# Patient Record
(demographics unavailable — no encounter records)

---

## 2025-01-23 NOTE — PHYSICAL EXAM
[General Appearance - Alert] : alert [General Appearance - Well Nourished] : well nourished [General Appearance - In No Acute Distress] : in no acute distress [General Appearance - Well Developed] : well developed [Oriented To Time, Place, And Person] : oriented to person, place, and time [Impaired Insight] : insight and judgment were intact [Affect] : the affect was normal [Mood] : the mood was normal [Motor Tone] : muscle tone was normal in all four extremities [Motor Strength] : muscle strength was normal in all four extremities [Balance] : balance was intact [Sclera] : the sclera and conjunctiva were normal [PERRL With Normal Accommodation] : pupils were equal in size, round, reactive to light, with normal accommodation [Extraocular Movements] : extraocular movements were intact [Full Visual Field] : full visual field [Outer Ear] : the ears and nose were normal in appearance [Neck Appearance] : the appearance of the neck was normal [Both Tympanic Membranes Were Examined] : both tympanic membranes were normal [] : no respiratory distress [Heart Rate And Rhythm] : heart rate was normal and rhythm regular [Bowel Sounds] : normal bowel sounds [Abnormal Walk] : normal gait [Skin Color & Pigmentation] : normal skin color and pigmentation

## 2025-01-23 NOTE — REVIEW OF SYSTEMS
[As Noted in HPI] : as noted in HPI [Numbness] : numbness [Lightheadedness] : lightheadedness [Tingling] : tingling [Negative] : Endocrine

## 2025-01-23 NOTE — PHYSICAL EXAM
[General Appearance - Alert] : alert [General Appearance - In No Acute Distress] : in no acute distress [General Appearance - Well Nourished] : well nourished [General Appearance - Well Developed] : well developed [Oriented To Time, Place, And Person] : oriented to person, place, and time [Impaired Insight] : insight and judgment were intact [Affect] : the affect was normal [Mood] : the mood was normal [Motor Strength] : muscle strength was normal in all four extremities [Motor Tone] : muscle tone was normal in all four extremities [Balance] : balance was intact [Sclera] : the sclera and conjunctiva were normal [PERRL With Normal Accommodation] : pupils were equal in size, round, reactive to light, with normal accommodation [Extraocular Movements] : extraocular movements were intact [Full Visual Field] : full visual field [Outer Ear] : the ears and nose were normal in appearance [Both Tympanic Membranes Were Examined] : both tympanic membranes were normal [Neck Appearance] : the appearance of the neck was normal [] : no respiratory distress [Heart Rate And Rhythm] : heart rate was normal and rhythm regular [Bowel Sounds] : normal bowel sounds [Abnormal Walk] : normal gait [Skin Color & Pigmentation] : normal skin color and pigmentation

## 2025-01-23 NOTE — REVIEW OF SYSTEMS
[As Noted in HPI] : as noted in HPI [Numbness] : numbness [Tingling] : tingling [Lightheadedness] : lightheadedness [Negative] : Endocrine

## 2025-01-24 NOTE — DATA REVIEWED
[de-identified] : MR ANGIO BRAIN ORDERED BY: EMANUEL JAAM 1/09/2025  INTERPRETATION: CLINICAL INDICATION: Fibromuscular dysplasia.  TECHNIQUE: MRA of the head was performed with noncontrast time-of-flight technique. Multiplanar reformations and maximum intensity projections were reviewed. Additionally, axial T2/FLAIR and diffusion-weighted sequences were obtained. Phase contrast quantitative MR angiography was also performed.  COMPARISON: None  FINDINGS: Flow related signal is not observed in the cervical left internal carotid artery. Left external carotid artery branches in the neck appear unusually prominent, including asymmetric prominence of the left internal maxillary and middle meningeal arteries as well as the artery of foramen ovale. Flow related signal within the left internal carotid artery becomes apparent in the distal cavernous segment of the artery, continues with prominent signal from the inferolateral trunk. Within the distal cavernous and proximal supraclinoid left internal carotid artery, flow related signal is thin. Distally, the left ICA is continuous with a large posterior communicating artery and the left middle cerebral artery. These vessels appear relatively normal without stenosis. Left posterior communicating artery is large. Left anterior cerebral artery is not well seen in its A1 segment, with reconstitution of the left anterior cerebral artery A2 segment through the anterior communicating artery. 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 Right cervical internal carotid artery is patent with mild irregularity near the skull base and appearance characteristic of fibrous dysplasia. Right internal carotid artery cavernous segment is tortuous with a large aneurysm arising from the dorsal aspect of the proximal cavernous artery, pointing anteriorly and superiorly, measuring about 5 x 5 x 5 mm on image 1:30 of series 201. This aneurysm appears to be a small neck about 2 mm. Distally, the tortuous right internal carotid artery supplies a relatively normal appearing middle cerebral artery and a tortuous but patent right anterior cerebral artery. A large posterior communicating artery is noted. Both vertebral arteries are patent in the neck. Left vertebral artery is smaller than the right.. Both vessels can be followed to their junction into the basilar artery. Basilar artery is patent. Both posterior cerebral arteries appear patent and relatively normal. At the level of the roof of the lateral ventricles, arborization in the bilateral MCA territories appears relatively similar, questionably slightly greater on the right. Background brain parenchyma is relatively unremarkable except for questionable minimal narrowing greater than expected global cerebral atrophy. No acute infarct is appreciated.  LEFT: ICA Not measured. MCA M1 188 ESTEBAN A1 Not measured. ESTEBAN A2 92 PCA P2 106 P-comm 183 (from the posterior to the anterior circulation) Vertebral 81  RIGHT:  MCA M1 149 ESTEBAN A1 184 ESTEBAN A2 80 PCA P2 69 P-comm 59 (from the anterior to the posterior circulation) Vertebral 269  BASILAR: 239  IMPRESSION:  1. No flow in the left internal carotid artery from the inferior margin of the field-of-view in the upper neck until a the point in the distal cavernous segment where there is reconstitution of flow through extensive collateralization.  2. Secondary to this extensive collateralization through the external carotid artery and the posterior circulation (with posterior to anterior flow in the large left posterior communicating artery), there is is relatively equal flow in the left and right middle cerebral arteries.  3. Saccular aneurysm measuring about 5 mm arising from the dorsal aspect of the proximal cavernous right internal carotid artery

## 2025-01-24 NOTE — DATA REVIEWED
[de-identified] : MR ANGIO BRAIN ORDERED BY: EMANUEL JAAM 1/09/2025  INTERPRETATION: CLINICAL INDICATION: Fibromuscular dysplasia.  TECHNIQUE: MRA of the head was performed with noncontrast time-of-flight technique. Multiplanar reformations and maximum intensity projections were reviewed. Additionally, axial T2/FLAIR and diffusion-weighted sequences were obtained. Phase contrast quantitative MR angiography was also performed.  COMPARISON: None  FINDINGS: Flow related signal is not observed in the cervical left internal carotid artery. Left external carotid artery branches in the neck appear unusually prominent, including asymmetric prominence of the left internal maxillary and middle meningeal arteries as well as the artery of foramen ovale. Flow related signal within the left internal carotid artery becomes apparent in the distal cavernous segment of the artery, continues with prominent signal from the inferolateral trunk. Within the distal cavernous and proximal supraclinoid left internal carotid artery, flow related signal is thin. Distally, the left ICA is continuous with a large posterior communicating artery and the left middle cerebral artery. These vessels appear relatively normal without stenosis. Left posterior communicating artery is large. Left anterior cerebral artery is not well seen in its A1 segment, with reconstitution of the left anterior cerebral artery A2 segment through the anterior communicating artery. 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 Right cervical internal carotid artery is patent with mild irregularity near the skull base and appearance characteristic of fibrous dysplasia. Right internal carotid artery cavernous segment is tortuous with a large aneurysm arising from the dorsal aspect of the proximal cavernous artery, pointing anteriorly and superiorly, measuring about 5 x 5 x 5 mm on image 1:30 of series 201. This aneurysm appears to be a small neck about 2 mm. Distally, the tortuous right internal carotid artery supplies a relatively normal appearing middle cerebral artery and a tortuous but patent right anterior cerebral artery. A large posterior communicating artery is noted. Both vertebral arteries are patent in the neck. Left vertebral artery is smaller than the right.. Both vessels can be followed to their junction into the basilar artery. Basilar artery is patent. Both posterior cerebral arteries appear patent and relatively normal. At the level of the roof of the lateral ventricles, arborization in the bilateral MCA territories appears relatively similar, questionably slightly greater on the right. Background brain parenchyma is relatively unremarkable except for questionable minimal narrowing greater than expected global cerebral atrophy. No acute infarct is appreciated.  LEFT: ICA Not measured. MCA M1 188 ESTEBAN A1 Not measured. ESTEBAN A2 92 PCA P2 106 P-comm 183 (from the posterior to the anterior circulation) Vertebral 81  RIGHT:  MCA M1 149 ESTEBAN A1 184 ESTEBAN A2 80 PCA P2 69 P-comm 59 (from the anterior to the posterior circulation) Vertebral 269  BASILAR: 239  IMPRESSION:  1. No flow in the left internal carotid artery from the inferior margin of the field-of-view in the upper neck until a the point in the distal cavernous segment where there is reconstitution of flow through extensive collateralization.  2. Secondary to this extensive collateralization through the external carotid artery and the posterior circulation (with posterior to anterior flow in the large left posterior communicating artery), there is is relatively equal flow in the left and right middle cerebral arteries.  3. Saccular aneurysm measuring about 5 mm arising from the dorsal aspect of the proximal cavernous right internal carotid artery

## 2025-01-24 NOTE — HISTORY OF PRESENT ILLNESS
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   Patient suffered stroke July 2024. She was at work when suddenly she noticed a glare in her Left eye, blurriness, severe headache, tingling in Left hand, and unable to complete simple tasks. She presented to [facility] ED where full stroke work up completed, found with Left posterior stroke 2/2 Left ICA dissection in setting of FMD. She was discharged with referral to Stroke Neurology and Neurosurgery. 2 weeks later, patient had another episode of Left eye blurry vision, lasting 5 mins. Since the stroke, she has had significant headaches, especially with activity. Stroke Neuro prescribed Gabapentin with good effect as well as Eliquis 5mg. Plan per Stroke Neuro was to start ASA 81mg in Jan 2025, and Neurosurgery recommended surgical intervention for the Left ICA occlusion.  12/05/24 - first appt with Dr. Gentile. Patient states she is doing well, Brain fog from stroke has noticeably improved. Following imaging reviewed:  - MRI Brain 11/08/24 - CTA Head/Neck 9/24 - diagnostic cerebral angiogram 7/05 - MRI Brain wawo 7/03 - MRV Head 7/03 - CTA Head/Neck 7/03 Plan to obtain new MRA NOVA and NM Spect, f/u with Stroke Neurology and Rheumatology.   TODAY patient endorses feeling fine other than an episode of feeling faint 2 weeks ago, lasting 5 mins. Endorses getting light-headed after working out or lowering head below heart. Right hand tingling since mid Jan. Saw Stroke Neurology last week, started patient on Aspirin 81mg daily, d/c Eliquis.    Soc Hx: Non-smoker, occasional EtOH, No known family history of strokes  PCP: none  Stroke Neuro: Dr. Antonia Pruitt [facility]  Neurosurgery: Dr. Mays

## 2025-01-24 NOTE — HISTORY OF PRESENT ILLNESS
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   Patient suffered stroke July 2024. She was at work when suddenly she noticed a glare in her Left eye, blurriness, severe headache, tingling in Left hand, and unable to complete simple tasks. She presented to [facility] ED where full stroke work up completed, found with Left posterior stroke 2/2 Left ICA dissection in setting of FMD. She was discharged with referral to Stroke Neurology and Neurosurgery. 2 weeks later, patient had another episode of Left eye blurry vision, lasting 5 mins. Since the stroke, she has had significant headaches, especially with activity. Stroke Neuro prescribed Gabapentin with good effect as well as Eliquis 5mg. Plan per Stroke Neuro was to start ASA 81mg in Jan 2025, and Neurosurgery recommended surgical intervention for the Left ICA occlusion.  12/05/24 - first appt with Dr. Gentile. Patient states she is doing well, Brain fog from stroke has noticeably improved. Following imaging reviewed:  - MRI Brain 11/08/24 - CTA Head/Neck 9/24 - diagnostic cerebral angiogram 7/05 - MRI Brain wawo 7/03 - MRV Head 7/03 - CTA Head/Neck 7/03 Plan to obtain new MRA NOVA and NM Spect, f/u with Stroke Neurology and Rheumatology.   TODAY patient endorses feeling fine other than an episode of feeling faint 2 weeks ago, lasting 5 mins. Endorses getting light-headed after working out or lowering head below heart. Right hand tingling since mid Jan. Saw Stroke Neurology last week, started patient on Aspirin 81mg daily, d/c Eliquis.    Soc Hx: Non-smoker, occasional EtOH, No known family history of strokes  PCP: none  Stroke Neuro: Dr. Antonia Pruitt [facility]  Neurosurgery: Dr. Mays

## 2025-01-24 NOTE — DATA REVIEWED
[de-identified] : MR ANGIO BRAIN ORDERED BY: EMANUEL JAMA 1/09/2025  INTERPRETATION: CLINICAL INDICATION: Fibromuscular dysplasia.  TECHNIQUE: MRA of the head was performed with noncontrast time-of-flight technique. Multiplanar reformations and maximum intensity projections were reviewed. Additionally, axial T2/FLAIR and diffusion-weighted sequences were obtained. Phase contrast quantitative MR angiography was also performed.  COMPARISON: None  FINDINGS: Flow related signal is not observed in the cervical left internal carotid artery. Left external carotid artery branches in the neck appear unusually prominent, including asymmetric prominence of the left internal maxillary and middle meningeal arteries as well as the artery of foramen ovale. Flow related signal within the left internal carotid artery becomes apparent in the distal cavernous segment of the artery, continues with prominent signal from the inferolateral trunk. Within the distal cavernous and proximal supraclinoid left internal carotid artery, flow related signal is thin. Distally, the left ICA is continuous with a large posterior communicating artery and the left middle cerebral artery. These vessels appear relatively normal without stenosis. Left posterior communicating artery is large. Left anterior cerebral artery is not well seen in its A1 segment, with reconstitution of the left anterior cerebral artery A2 segment through the anterior communicating artery. 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 Right cervical internal carotid artery is patent with mild irregularity near the skull base and appearance characteristic of fibrous dysplasia. Right internal carotid artery cavernous segment is tortuous with a large aneurysm arising from the dorsal aspect of the proximal cavernous artery, pointing anteriorly and superiorly, measuring about 5 x 5 x 5 mm on image 1:30 of series 201. This aneurysm appears to be a small neck about 2 mm. Distally, the tortuous right internal carotid artery supplies a relatively normal appearing middle cerebral artery and a tortuous but patent right anterior cerebral artery. A large posterior communicating artery is noted. Both vertebral arteries are patent in the neck. Left vertebral artery is smaller than the right.. Both vessels can be followed to their junction into the basilar artery. Basilar artery is patent. Both posterior cerebral arteries appear patent and relatively normal. At the level of the roof of the lateral ventricles, arborization in the bilateral MCA territories appears relatively similar, questionably slightly greater on the right. Background brain parenchyma is relatively unremarkable except for questionable minimal narrowing greater than expected global cerebral atrophy. No acute infarct is appreciated.  LEFT: ICA Not measured. MCA M1 188 ESTEBAN A1 Not measured. ESTEBAN A2 92 PCA P2 106 P-comm 183 (from the posterior to the anterior circulation) Vertebral 81  RIGHT:  MCA M1 149 ESTEBAN A1 184 ESTEBAN A2 80 PCA P2 69 P-comm 59 (from the anterior to the posterior circulation) Vertebral 269  BASILAR: 239  IMPRESSION:  1. No flow in the left internal carotid artery from the inferior margin of the field-of-view in the upper neck until a the point in the distal cavernous segment where there is reconstitution of flow through extensive collateralization.  2. Secondary to this extensive collateralization through the external carotid artery and the posterior circulation (with posterior to anterior flow in the large left posterior communicating artery), there is is relatively equal flow in the left and right middle cerebral arteries.  3. Saccular aneurysm measuring about 5 mm arising from the dorsal aspect of the proximal cavernous right internal carotid artery

## 2025-01-24 NOTE — ASSESSMENT
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 NOVA shows good flow in left MCA SPECT shows no evidence of decreased perfusion.   I have discussed the natural history and treatment options for Fibromuscular Dysplasia with the patient. I explained the indications for observation with imaging surveillance, medical management, as well as endovascular treatments. We discussed the risks, benefits, possible complications, and expected outcomes related to each treatment option. In the end I recommend repeat MRA of the head and neck in 1 yr to f/u her cerebral vasculature.    PLAN: - Repeat MRA Head/Neck in 1 yr (Jan 2026) - referral to Rheumatology re-given to patient - return to Dr. Gentile clinic to review   The patient was instructed that if they should immediately call 911 or go to the Emergency Department if they experience symptoms of severe thunderclap headache, syncope, unexplained nausea/vomiting, visual changes, seizure-like activity, new weakness or numbness of extremities.   I reviewed and answered all patient questions. Patient verbalizes agreement and understanding with plan of care. Patient verbalizes agreement and understanding with plan of care.  I, Dr. Gentile, personally performed the evaluation and management (E/M) services for this established patient who presents today with (a) new problem(s)/exacerbation of (an) existing condition(s). That E/M includes conducting the examination, assessing all new/exacerbated conditions, and establishing a new plan of care. Today, CHEO Delgado, was here to observe my evaluation and management services for this new problem/exacerbated condition to be followed going forward.

## 2025-01-24 NOTE — HISTORY OF PRESENT ILLNESS
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   Patient suffered stroke July 2024. She was at work when suddenly she noticed a glare in her Left eye, blurriness, severe headache, tingling in Left hand, and unable to complete simple tasks. She presented to [facility] ED where full stroke work up completed, found with Left posterior stroke 2/2 Left ICA dissection in setting of FMD. She was discharged with referral to Stroke Neurology and Neurosurgery. 2 weeks later, patient had another episode of Left eye blurry vision, lasting 5 mins. Since the stroke, she has had significant headaches, especially with activity. Stroke Neuro prescribed Gabapentin with good effect as well as Eliquis 5mg. Plan per Stroke Neuro was to start ASA 81mg in Jan 2025, and Neurosurgery recommended surgical intervention for the Left ICA occlusion.  12/05/24 - first appt with Dr. Gentile. Patient states she is doing well, Brain fog from stroke has noticeably improved. Following imaging reviewed:  - MRI Brain 11/08/24 - CTA Head/Neck 9/24 - diagnostic cerebral angiogram 7/05 - MRI Brain wawo 7/03 - MRV Head 7/03 - CTA Head/Neck 7/03 Plan to obtain new MRA NOVA and NM Spect, f/u with Stroke Neurology and Rheumatology.   TODAY patient endorses feeling fine other than an episode of feeling faint 2 weeks ago, lasting 5 mins. Endorses getting light-headed after working out or lowering head below heart. Right hand tingling since mid Jan. Saw Stroke Neurology last week, started patient on Aspirin 81mg daily, d/c Eliquis.    Soc Hx: Non-smoker, occasional EtOH, No known family history of strokes  PCP: none  Stroke Neuro: Dr. Antonia Pruitt [facility]  Neurosurgery: Dr. Mays

## 2025-01-24 NOTE — REASON FOR VISIT
[Follow-Up: _____] : a [unfilled] follow-up visit [Spouse] : spouse [Family Member] : family member [FreeTextEntry1] : Left ICA dissection and occlusion

## 2025-03-26 NOTE — HISTORY OF PRESENT ILLNESS
[FreeTextEntry1] :  - patient loss feeling over the right side with blurry vision and decreased ability to understand - diagnosed with fibromuscular d/c on eliquis - now os ASA 81 mg daily has recovered fully told as a child to have some abnormalities with her heart  feels well with no complains today

## 2025-04-28 NOTE — REASON FOR VISIT
[Consultation] : a consultation regarding [FreeTextEntry1] : 28 year old F with prior history of CVA 7/2024 (left posterior stroke 2/2 LICA dissection), proximal cavernous FLORENCE aneurysm, referred for evaluation of FMD.   Stroke 7/2024. She was at work when suddenly she noticed a glare in her Left eye, blurriness, severe headache, tingling in Left hand, and unable to complete simple tasks. She presented to [facility] ED where full stroke work up completed, found with Left posterior stroke 2/2 Left ICA dissection; she was told this was all related to FMD. 2 weeks post had recurrence of left blurry vision. She also had significant migraines. This has been improved with Gabapentin and Elliguis 5mg bid.  Now transitioned to ASA 81mg.   Still with headaches with exercise. Happened when she was wearing weights while working out.   She has only MRA of the head in the chart; no prior CTA neck, chest, abd/pelvis. She thinks maybe they did a CTA somewhere. She would like to look before repeating scans.   Migraines since the stroke. No history of pulsatile tinnitus. No issues with hypertension.   Only thing she can attribute to that day of the stroke is being really stressed. No history of prior neck massage or neck manipulation.   One child, no issue with pregnancy. No family history of stroke, intracranial aneurysm, FMD, dissection. No SCD.

## 2025-04-28 NOTE — ASSESSMENT
[FreeTextEntry1] : #history of posterior stroke, LICA dissection No dx of FMD prior to this; will need dedicated CTA neck to look at FLORENCE (MRA can have artifact, not definitive FMD on that side). Also need CTA abd/pelvis to look for FMD and visceral aneurysms.   -imaging as above -she is going to get her imaging from OSH where she thinks she had a CTA to minimize amount of scans -ASA 81mg -discussed monitoring BP; avoidance of  rapid head and neck movements (rollercoasters and chiropractic neck manipulation), avoidance of heavy lifting (requiring straining to lift), went over her current exercise regimen and thinks to be cautious with -will review any type of screening that should be done for children once we have a definitive diagnosis -f/u once imaging is done  -agree with surveillance imaging being done for cerebral aneurysm with NSY

## 2025-04-28 NOTE — PHYSICAL EXAM
[Eyelids - No Xanthelasma] : the eyelids demonstrated no xanthelasmas [No Oral Cyanosis] : no oral cyanosis [Heart Rate And Rhythm] : heart rate and rhythm were normal [Heart Sounds] : normal S1 and S2 [Murmurs] : no murmurs present [Arterial Pulses Normal] : the arterial pulses were normal [Edema] : no peripheral edema present [Abdomen Soft] : soft [Abdomen Tenderness] : non-tender [FreeTextEntry1] : no abdominal bruits [Abnormal Walk] : normal gait [] : no ischemic changes [Skin Color & Pigmentation] : normal skin color and pigmentation [No Venous Stasis] : no venous stasis [No Skin Ulcers] : no skin ulcer [Oriented To Time, Place, And Person] : oriented to person, place, and time [Impaired Insight] : insight and judgment were intact [Affect] : the affect was normal

## 2025-04-28 NOTE — REASON FOR VISIT
[Consultation] : a consultation regarding [FreeTextEntry1] : 28 year old F with prior history of CVA 7/2024 (left posterior stroke 2/2 LICA dissection), proximal cavernous FLORENCE aneurysm, referred for evaluation of FMD.   Stroke 7/2024. She was at work when suddenly she noticed a glare in her Left eye, blurriness, severe headache, tingling in Left hand, and unable to complete simple tasks. She presented to [facility] ED where full stroke work up completed, found with Left posterior stroke 2/2 Left ICA dissection; she was told this was all related to FMD. 2 weeks post had recurrence of left blurry vision. She also had significant migraines. This has been improved with Gabapentin and Elliguis 5mg bid.  Now transitioned to ASA 81mg.   Still with headaches with exercise. Happened when she was wearing weights while working out.   She has only MRA of the head in the chart; no prior CTA neck, chest, abd/pelvis. She thinks maybe they did a CTA somewhere. She would like to look before repeating scans.   Migraines since the stroke. No history of pulsatile tinnitus. No issues with hypertension.   Only thing she can attribute to that day of the stroke is being really stressed. No history of prior neck massage or neck manipulation.   One child, no issue with pregnancy. No family history of stroke, intracranial aneurysm, FMD, dissection. No SCD.